# Patient Record
(demographics unavailable — no encounter records)

---

## 2025-03-06 NOTE — HEALTH RISK ASSESSMENT
[Good] : ~his/her~  mood as  good [Monthly or less (1 pt)] : Monthly or less (1 point) [1 or 2 (0 pts)] : 1 or 2 (0 points) [Never (0 pts)] : Never (0 points) [Yes] : In the past 12 months have you used drugs other than those required for medical reasons? Yes [No falls in past year] : Patient reported no falls in the past year [Little interest or pleasure doing things] : 1) Little interest or pleasure doing things [Feeling down, depressed, or hopeless] : 2) Feeling down, depressed, or hopeless [0] : 2) Feeling down, depressed, or hopeless: Not at all (0) [PHQ-2 Negative - No further assessment needed] : PHQ-2 Negative - No further assessment needed [HIV Test offered] : HIV Test offered [Hepatitis C test offered] : Hepatitis C test offered [Sexually Active] : sexually active [Feels Safe at Home] : Feels safe at home [Fully functional (bathing, dressing, toileting, transferring, walking, feeding)] : Fully functional (bathing, dressing, toileting, transferring, walking, feeding) [Fully functional (using the telephone, shopping, preparing meals, housekeeping, doing laundry, using] : Fully functional and needs no help or supervision to perform IADLs (using the telephone, shopping, preparing meals, housekeeping, doing laundry, using transportation, managing medications and managing finances) [Reports normal functional visual acuity (ie: able to read med bottle)] : Reports normal functional visual acuity [Smoke Detector] : smoke detector [Carbon Monoxide Detector] : carbon monoxide detector [Safety elements used in home] : safety elements used in home [Seat Belt] :  uses seat belt [Sunscreen] : uses sunscreen [Never] : Never [NO] : No [FreeTextEntry1] : physical for work, need QuantiFERON , STI testing, requesting HPV vaccine [de-identified] : none [de-identified] : ENDO [de-identified] : rarely [Audit-CScore] : 1 [de-identified] : marijuana [de-identified] : pt is active [de-identified] : fair [[id number]] : 0 [High Risk Behavior] : no high risk behavior [Reports changes in hearing] : Reports no changes in hearing [Reports changes in vision] : Reports no changes in vision [Reports changes in dental health] : Reports no changes in dental health [Travel to Developing Areas] : does not  travel to developing areas [TB Exposure] : is not being exposed to tuberculosis [Caregiver Concerns] : does not have caregiver concerns

## 2025-03-06 NOTE — HISTORY OF PRESENT ILLNESS
[de-identified] : 32 M with hyperthyroidism presenting for a CPE. No acute concerns. He wants HPV vaccine, STI testing and TB screening.

## 2025-03-06 NOTE — PLAN
[FreeTextEntry1] :  #HCM - CBC with differential, HIV, Hep C, CMP, hemoglobin A1c, STI testing, quant gold, vitamin B12, vitamin D, urine analysis, lipid panel, TFTs all ordered. Patient to be informed of results